# Patient Record
Sex: MALE | Race: BLACK OR AFRICAN AMERICAN | NOT HISPANIC OR LATINO | Employment: FULL TIME | URBAN - METROPOLITAN AREA
[De-identification: names, ages, dates, MRNs, and addresses within clinical notes are randomized per-mention and may not be internally consistent; named-entity substitution may affect disease eponyms.]

---

## 2017-05-09 ENCOUNTER — HOSPITAL ENCOUNTER (EMERGENCY)
Facility: CLINIC | Age: 37
Discharge: HOME | End: 2017-05-09
Attending: EMERGENCY MEDICINE | Admitting: EMERGENCY MEDICINE
Payer: COMMERCIAL

## 2017-05-09 VITALS
OXYGEN SATURATION: 99 % | HEIGHT: 67 IN | RESPIRATION RATE: 16 BRPM | WEIGHT: 230 LBS | HEART RATE: 85 BPM | SYSTOLIC BLOOD PRESSURE: 149 MMHG | DIASTOLIC BLOOD PRESSURE: 106 MMHG | TEMPERATURE: 98.9 F | BODY MASS INDEX: 36.1 KG/M2

## 2017-05-09 DIAGNOSIS — K92.0 HEMATEMESIS WITHOUT NAUSEA: ICD-10-CM

## 2017-05-09 DIAGNOSIS — K22.6 MALLORY-WEISS TEAR: ICD-10-CM

## 2017-05-09 LAB
ABO + RH BLD: NORMAL
ABO + RH BLD: NORMAL
ALBUMIN SERPL-MCNC: 4.5 G/DL (ref 3.4–5)
ALP SERPL-CCNC: 56 U/L (ref 40–150)
ALT SERPL W P-5'-P-CCNC: 63 U/L (ref 0–70)
ANION GAP SERPL CALCULATED.3IONS-SCNC: 9 MMOL/L (ref 3–14)
AST SERPL W P-5'-P-CCNC: 35 U/L (ref 0–45)
BASOPHILS # BLD AUTO: 0 10E9/L (ref 0–0.2)
BASOPHILS NFR BLD AUTO: 0.3 %
BILIRUB SERPL-MCNC: 0.5 MG/DL (ref 0.2–1.3)
BLD GP AB SCN SERPL QL: NORMAL
BLOOD BANK CMNT PATIENT-IMP: NORMAL
BUN SERPL-MCNC: 13 MG/DL (ref 7–30)
CALCIUM SERPL-MCNC: 9.1 MG/DL (ref 8.5–10.1)
CHLORIDE SERPL-SCNC: 106 MMOL/L (ref 94–109)
CO2 SERPL-SCNC: 25 MMOL/L (ref 20–32)
CREAT SERPL-MCNC: 0.98 MG/DL (ref 0.66–1.25)
DIFFERENTIAL METHOD BLD: NORMAL
EOSINOPHIL # BLD AUTO: 0.2 10E9/L (ref 0–0.7)
EOSINOPHIL NFR BLD AUTO: 2.7 %
ERYTHROCYTE [DISTWIDTH] IN BLOOD BY AUTOMATED COUNT: 12.9 % (ref 10–15)
GFR SERPL CREATININE-BSD FRML MDRD: 86 ML/MIN/1.7M2
GLUCOSE SERPL-MCNC: 90 MG/DL (ref 70–99)
HCT VFR BLD AUTO: 40.2 % (ref 40–53)
HGB BLD-MCNC: 14 G/DL (ref 13.3–17.7)
HGB BLD-MCNC: 14.2 G/DL (ref 13.3–17.7)
IMM GRANULOCYTES # BLD: 0 10E9/L (ref 0–0.4)
IMM GRANULOCYTES NFR BLD: 0.3 %
LYMPHOCYTES # BLD AUTO: 2 10E9/L (ref 0.8–5.3)
LYMPHOCYTES NFR BLD AUTO: 28.4 %
MCH RBC QN AUTO: 31.8 PG (ref 26.5–33)
MCHC RBC AUTO-ENTMCNC: 35.3 G/DL (ref 31.5–36.5)
MCV RBC AUTO: 90 FL (ref 78–100)
MONOCYTES # BLD AUTO: 0.6 10E9/L (ref 0–1.3)
MONOCYTES NFR BLD AUTO: 9 %
NEUTROPHILS # BLD AUTO: 4.1 10E9/L (ref 1.6–8.3)
NEUTROPHILS NFR BLD AUTO: 59.3 %
NRBC # BLD AUTO: 0 10*3/UL
NRBC BLD AUTO-RTO: 0 /100
PLATELET # BLD AUTO: 189 10E9/L (ref 150–450)
POTASSIUM SERPL-SCNC: 4.6 MMOL/L (ref 3.4–5.3)
PROT SERPL-MCNC: 7.9 G/DL (ref 6.8–8.8)
RBC # BLD AUTO: 4.46 10E12/L (ref 4.4–5.9)
SODIUM SERPL-SCNC: 140 MMOL/L (ref 133–144)
SPECIMEN EXP DATE BLD: NORMAL
WBC # BLD AUTO: 6.9 10E9/L (ref 4–11)

## 2017-05-09 PROCEDURE — 25000128 H RX IP 250 OP 636: Performed by: EMERGENCY MEDICINE

## 2017-05-09 PROCEDURE — 86900 BLOOD TYPING SEROLOGIC ABO: CPT | Performed by: EMERGENCY MEDICINE

## 2017-05-09 PROCEDURE — 85018 HEMOGLOBIN: CPT | Mod: 91 | Performed by: EMERGENCY MEDICINE

## 2017-05-09 PROCEDURE — 86901 BLOOD TYPING SEROLOGIC RH(D): CPT | Performed by: EMERGENCY MEDICINE

## 2017-05-09 PROCEDURE — 99284 EMERGENCY DEPT VISIT MOD MDM: CPT | Mod: 25

## 2017-05-09 PROCEDURE — 96361 HYDRATE IV INFUSION ADD-ON: CPT

## 2017-05-09 PROCEDURE — 86850 RBC ANTIBODY SCREEN: CPT | Performed by: EMERGENCY MEDICINE

## 2017-05-09 PROCEDURE — 96360 HYDRATION IV INFUSION INIT: CPT

## 2017-05-09 PROCEDURE — 80053 COMPREHEN METABOLIC PANEL: CPT | Performed by: EMERGENCY MEDICINE

## 2017-05-09 PROCEDURE — 85025 COMPLETE CBC W/AUTO DIFF WBC: CPT | Performed by: EMERGENCY MEDICINE

## 2017-05-09 RX ORDER — SODIUM CHLORIDE 9 MG/ML
1000 INJECTION, SOLUTION INTRAVENOUS CONTINUOUS
Status: DISCONTINUED | OUTPATIENT
Start: 2017-05-09 | End: 2017-05-09 | Stop reason: HOSPADM

## 2017-05-09 RX ORDER — ASPIRIN 81 MG/1
81 TABLET, CHEWABLE ORAL DAILY
COMMUNITY

## 2017-05-09 RX ADMIN — SODIUM CHLORIDE 1000 ML: 9 INJECTION, SOLUTION INTRAVENOUS at 15:20

## 2017-05-09 ASSESSMENT — ENCOUNTER SYMPTOMS
BLOOD IN STOOL: 0
VOMITING: 1
NAUSEA: 1
DIARRHEA: 0
CONSTIPATION: 0

## 2017-05-09 NOTE — ED PROVIDER NOTES
History     Chief Complaint:  Hematemesis     HPI   Pardeep Whaley is a 36 year old male who presents to the emergency department today for evaluation of hematemesis. The patient reports that he had spaghetti for lunch today at 1430 and then 10-15 minutes later he became nauseated and went to the bathroom and had an episode of vomiting. He states that he vomited up all his food and then at the very end he vomited up several dark clots of blood. The patient notes that he did not have any pain at the time of this episode of hematemesis and he currently endorses no pain here in the emergency department. He denies any recent black, blood, or otherwise abnormal bowel movements. He drinks about one cup of coffee daily and he reports that his stress level has been grossly normal recently.     Allergies:  Lisinopril    Medications:    Metoprolol   Tylenol   Benadryl  Zyrtec     Past Medical History:    Asthma  Hypertension  Paroxysmal atrial fibrillation     Past Surgical History:    Cardioversion  Ablation focal atrial fibrillation     Family History:    Father: Coronary Artery Disease, Diabetes   Maternal Grandmother: Heart Disease     Social History:  Smoking Status: Never Smoker  Smokeless Tobacco: Never Used  Alcohol Use: Negative   Marital Status:   [2]     Review of Systems   Gastrointestinal: Positive for nausea and vomiting (Hematemesis). Negative for blood in stool, constipation and diarrhea.   All other systems reviewed and are negative.    Physical Exam   Vitals:  Patient Vitals for the past 24 hrs:   BP Temp Temp src Pulse Heart Rate Resp SpO2 Height Weight   05/09/17 1801 - - - - 78 16 99 % - -   05/09/17 1700 (!) 149/106 - - - 80 13 99 % - -   05/09/17 1645 - - - - 71 11 98 % - -   05/09/17 1630 (!) 149/101 - - - 70 9 98 % - -   05/09/17 1615 - - - - 73 15 99 % - -   05/09/17 1600 (!) 163/99 - - - 75 16 99 % - -   05/09/17 1530 (!) 144/98 - - - 78 11 - - -   05/09/17 1521 - - - - 80 13 - - -  "  05/09/17 1516 (!) 149/98 - - - - - - - -   05/09/17 1509 (!) 150/97 98.9  F (37.2  C) Oral 85 - 18 98 % 1.702 m (5' 7\") 104.3 kg (230 lb)      Physical Exam  Eye:  Pupils are equal, round, and reactive.  Extraocular movements intact.    ENT:  No rhinorrhea.  Moist mucus membranes.  Normal tongue and tonsil.    Cardiac:  Regular rate and rhythm.  No murmurs, gallops, or rubs.    Pulmonary:  Clear to auscultation bilaterally.  No wheezes, rales, or rhonchi.    Abdomen:  Positive bowel sounds.  Abdomen is soft and non-distended, without focal tenderness.    Musculoskeletal:  Normal movement of all extremities without evidence for deficit.    Skin:  Warm and dry without rashes.    Neurologic:  Non-focal exam without asymmetric weakness or numbness.     Psychiatric:  Normal affect with appropriate interaction with examiner.      Emergency Department Course     Laboratory:  Laboratory findings were communicated with the patient who voiced understanding of the findings.    Hemoglobin: 14.0  CBC: WBC 6.9, HGB 14.2,   CMP: Creatinine 0.98  ABO/Rh type and screen: ABO: O, Rh(D): Positive, Antibody Screen: Negative     Interventions:  1520 NS 1000 ml IV     Emergency Department Course:  Nursing notes and vitals reviewed.  I performed an exam of the patient as documented above.   IV was inserted and blood was drawn for laboratory testing, results above.  I discussed the treatment plan with the patient. They expressed understanding of this plan and consented to discharge. They will be discharged home with instructions for care and follow up. In addition, the patient will return to the emergency department if their symptoms persist, worsen, if new symptoms arise or if there is any concern.  All questions were answered.  I personally reviewed the lab results with the patient and answered all related questions prior to discharge.  Impression & Plan      Medical Decision Making:  Pardeep Whaley is a healthy 36 year old male " who is not on anticoagulants presents to us after having some hematemesis. He notes that he ate some spaghetti that did not agree with him, causing him to feel nauseated where he then suffered 2-3 episodes of vomiting. As the vomiting continued, he then started to notice that there was maroon blood and eventually some blood clots associated with it. That concerned him to come in. On my exam here, he has no complaints. His abdomen is soft and benign. An initial round of blood work shows no acute abnormality. I kept him here for a 2 hour hemoglobin recheck where he showed no drop in his hemoglobin. I feel that this all likely represents a Sagrario-Mcnamara tear rather than a gastric ulceration or other intraabdominal bleeding. However, I was clear with him that he needs to be vigilant, looking for signs of melena and that there should be no hesitation to return to the emergency department if he has further blood emesis, abdominal pain, syncope, or other emergent concerns.       Diagnosis:    ICD-10-CM    1. Hematemesis without nausea K92.0    2. 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    Scribe Disclosure:  I, Harsh Moreno, am serving as a scribe at 3:10 PM on 5/9/2017 to document services personally performed by Trierweiler, Chad A, MD, based on my observations and the provider's statements to me.     EMERGENCY DEPARTMENT       Trierweiler, Chad A, MD  05/10/17 2031

## 2017-05-09 NOTE — ED NOTES
Bed: ED24  Expected date:   Expected time:   Means of arrival:   Comments:  allina - vomiting blood

## 2017-05-09 NOTE — DISCHARGE INSTRUCTIONS
Sagrario-Mcnamara Tear  Your esophagus is the tube that carries food from your mouth to your stomach. It plays an important role in digestion. Sometimes a person can tear the tissue of the lower esophagus, and it can begin to bleed. This is called a Sagrario-Mcnamara tear.  Causes and Symptoms of a Sagrario-Mcnamara Tear  The most common cause of a tear is violent coughing or vomiting. Increased pressure in your belly (abdomen) from a hiatal hernia or childbirth can also lead to a tear.  A tear can cause symptoms such as:    Vomit that is bright red or looks like coffee grounds    Stools that are black or sticky like tar    Weakness, dizziness, faintness, or shortness of breath    Diarrhea    Abdominal pain  If the bleeding is not treated, it can continue for a long time. This can cause anemia, fatigue, and shortness of breath.  Diagnosing and Treating a Sagrario-Mcnamara Tear  If you have symptoms, your health care provider may test your stool to look for blood. You may have an endoscopy. This is a procedure to look at your esophagus. It uses a tool called an endoscope. This is a thin, flexible tube with a camera at the end. The scope is put through your mouth and down into your esophagus. This lets your health care provider look at the inside of your esophagus.  In most cases, a Sagrario-Mcnamara tear will stop bleeding and heal on its own. But some people will need treatment. If needed, your health care provider will treat your tear through the endoscope. You may have an injection to make the bleeding stop. You may be given a heat treatment to close the wound. Or a tiny clip may be used to close the tear.     When to Call the Health Care Provider  In rare cases, a Sagrario-Mcnamara tear can lead to severe internal bleeding. This is a medical emergency. Call 911 if you have:    A rapid, weak pulse    A blue tint to your lips or fingernails    Very little urine    Pale skin that s cool and moist to the touch    Confusion    Shallow  breathing     4599-3449 [employer]. [address] [place] [address], [place] [address]. All rights reserved. This information is not intended as a substitute for professional medical care. Always follow your healthcare professional's instructions.

## 2017-05-09 NOTE — ED AVS SNAPSHOT
Emergency Department    [address] [facility] [address]    Phone:  [phone]    Fax:  [phone]                                       Pardeep Whaley   MRN: [id number]    Department:   Emergency Department   Date of Visit:  5/9/2017           Patient Information     Date Of Birth          1980        Your diagnoses for this visit were:     Hematemesis without nausea     Sagrario-Mcnamara tear        You were seen by Trierweiler, Chad A, MD.      Follow-up Information     Follow up with Casper Murdock MD In 1 week.    Specialty:  Family Practice    Contact information:    [facility]  [address] [facility] [id number] [phone]          Follow up with  Emergency Department.    Specialty:  EMERGENCY MEDICINE    Why:  If symptoms worsen    Contact information:    [address] [facility] [id number] [phone]        Discharge Instructions         Sagrario-Mcnamara Tear  Your esophagus is the tube that carries food from your mouth to your stomach. It plays an important role in digestion. Sometimes a person can tear the tissue of the lower esophagus, and it can begin to bleed. This is called a Sagrario-Mcnamara tear.  Causes and Symptoms of a Sagrario-Mcnamara Tear  The most common cause of a tear is violent coughing or vomiting. Increased pressure in your belly (abdomen) from a hiatal hernia or childbirth can also lead to a tear.  A tear can cause symptoms such as:    Vomit that is bright red or looks like coffee grounds    Stools that are black or sticky like tar    Weakness, dizziness, faintness, or shortness of breath    Diarrhea    Abdominal pain  If the bleeding is not treated, it can continue for a long time. This can cause anemia, fatigue, and shortness of breath.  Diagnosing and Treating a Sagrario-Mcnamara Tear  If you have symptoms, your health care provider may test your stool to look for blood. You may have an endoscopy. This is a procedure to look at your  esophagus. It uses a tool called an endoscope. This is a thin, flexible tube with a camera at the end. The scope is put through your mouth and down into your esophagus. This lets your health care provider look at the inside of your esophagus.  In most cases, a Sagrario-Mcnamara tear will stop bleeding and heal on its own. But some people will need treatment. If needed, your health care provider will treat your tear through the endoscope. You may have an injection to make the bleeding stop. You may be given a heat treatment to close the wound. Or a tiny clip may be used to close the tear.     When to Call the Health Care Provider  In rare cases, a Sagrario-Mcnamara tear can lead to severe internal bleeding. This is a medical emergency. Call 911 if you have:    A rapid, weak pulse    A blue tint to your lips or fingernails    Very little urine    Pale skin that s cool and moist to the touch    Confusion    Shallow breathing     6091-9812 [employer]. [address] [place] [address]. All rights reserved. This information is not intended as a substitute for professional medical care. Always follow your healthcare professional's instructions.          24 Hour Appointment Hotline       To make an appointment at any [facility], call [phone] ([phone]). If you don't have a family doctor or clinic, we will help you find one. [facility] clinics are conveniently located to serve the needs of you and your family.             Review of your medicines      Our records show that you are taking the medicines listed below. If these are incorrect, please call your family doctor or clinic.        Dose / Directions Last dose taken    aspirin 81 MG chewable tablet   Dose:  81 mg        Take 81 mg by mouth daily   Refills:  0        BENADRYL PO   Dose:  25 mg        Take 25 mg by mouth every 6 hours as needed   Refills:  0        cetirizine 10 MG tablet   Commonly known as:  zyrTEC   Dose:  10 mg         Take 10 mg by mouth daily as needed   Refills:  0        metoprolol 25 MG 24 hr tablet   Commonly known as:  TOPROL-XL   Dose:  75 mg        Take 3 tablets (75 mg) by mouth 2 times daily   Refills:  0        TYLENOL PM EXTRA STRENGTH PO        Take by mouth daily as needed   Refills:  0                Procedures and tests performed during your visit     ABO/Rh type and screen    CBC with platelets differential    Comprehensive metabolic panel    Hemoglobin    Peripheral IV catheter      Orders Needing Specimen Collection     None      Pending Results     No orders found from 5/7/2017 to 5/10/2017.            Pending Culture Results     No orders found from 5/7/2017 to 5/10/2017.            Pending Results Instructions     If you had any lab results that were not finalized at the time of your Discharge, you can call the ED Lab Result RN at [phone]. You will be contacted by this team for any positive Lab results or changes in treatment. The nurses are available 7 days a week from 10A to 6:30P.  You can leave a message 24 hours per day and they will return your call.        Test Results From Your Hospital Stay        5/9/2017  3:33 PM      Component Results     Component Value Ref Range & Units Status    WBC 6.9 4.0 - 11.0 10e9/L Final    RBC Count 4.46 4.4 - 5.9 10e12/L Final    Hemoglobin 14.2 13.3 - 17.7 g/dL Final    Hematocrit 40.2 40.0 - 53.0 % Final    MCV 90 78 - 100 fl Final    MCH 31.8 26.5 - 33.0 pg Final    MCHC 35.3 31.5 - 36.5 g/dL Final    RDW 12.9 10.0 - 15.0 % Final    Platelet Count 189 150 - 450 10e9/L Final    Diff Method Automated Method  Final    % Neutrophils 59.3 % Final    % Lymphocytes 28.4 % Final    % Monocytes 9.0 % Final    % Eosinophils 2.7 % Final    % Basophils 0.3 % Final    % Immature Granulocytes 0.3 % Final    Nucleated RBCs 0 0 /100 Final    Absolute Neutrophil 4.1 1.6 - 8.3 10e9/L Final    Absolute Lymphocytes 2.0 0.8 - 5.3 10e9/L Final    Absolute Monocytes 0.6 0.0 - 1.3  10e9/L Final    Absolute Eosinophils 0.2 0.0 - 0.7 10e9/L Final    Absolute Basophils 0.0 0.0 - 0.2 10e9/L Final    Abs Immature Granulocytes 0.0 0 - 0.4 10e9/L Final    Absolute Nucleated RBC 0.0  Final         5/9/2017  3:49 PM      Component Results     Component Value Ref Range & Units Status    Sodium 140 133 - 144 mmol/L Final    Potassium 4.6 3.4 - 5.3 mmol/L Final    Chloride 106 94 - 109 mmol/L Final    Carbon Dioxide 25 20 - 32 mmol/L Final    Anion Gap 9 3 - 14 mmol/L Final    Glucose 90 70 - 99 mg/dL Final    Urea Nitrogen 13 7 - 30 mg/dL Final    Creatinine 0.98 0.66 - 1.25 mg/dL Final    GFR Estimate 86 >60 mL/min/1.7m2 Final    Non  GFR Calc    GFR Estimate If Black >90   GFR Calc   >60 mL/min/1.7m2 Final    Calcium 9.1 8.5 - 10.1 mg/dL Final    Bilirubin Total 0.5 0.2 - 1.3 mg/dL Final    Albumin 4.5 3.4 - 5.0 g/dL Final    Protein Total 7.9 6.8 - 8.8 g/dL Final    Alkaline Phosphatase 56 40 - 150 U/L Final    ALT 63 0 - 70 U/L Final    AST 35 0 - 45 U/L Final         5/9/2017  4:07 PM      Component Results     Component    ABO    O    RH(D)     Pos    Antibody Screen    Neg    Test Valid Only At    [facility]    Specimen Expires    05/12/2017 5/9/2017  5:31 PM      Component Results     Component Value Ref Range & Units Status    Hemoglobin 14.0 13.3 - 17.7 g/dL Final                Clinical Quality Measure: Blood Pressure Screening     Your blood pressure was checked while you were in the emergency department today. The last reading we obtained was  BP: (!) 149/106 . Please read the guidelines below about what these numbers mean and what you should do about them.  If your systolic blood pressure (the top number) is less than 120 and your diastolic blood pressure (the bottom number) is less than 80, then your blood pressure is normal. There is nothing more that you need to do about it.  If your systolic blood pressure (the top number) is  "120-139 or your diastolic blood pressure (the bottom number) is 80-89, your blood pressure may be higher than it should be. You should have your blood pressure rechecked within a year by a primary care provider.  If your systolic blood pressure (the top number) is 140 or greater or your diastolic blood pressure (the bottom number) is 90 or greater, you may have high blood pressure. High blood pressure is treatable, but if left untreated over time it can put you at risk for heart attack, stroke, or kidney failure. You should have your blood pressure rechecked by a primary care provider within the next 4 weeks.  If your provider in the emergency department today gave you specific instructions to follow-up with your doctor or provider even sooner than that, you should follow that instruction and not wait for up to 4 weeks for your follow-up visit.        Thank you for choosing [facility]       Thank you for choosing [facility] for your care. Our goal is always to provide you with excellent care. Hearing back from our patients is one way we can continue to improve our services. Please take a few minutes to complete the written survey that you may receive in the mail after you visit with us. Thank you!        [employer] Information     [employer] lets you send messages to your doctor, view your test results, renew your prescriptions, schedule appointments and more. To sign up, go to [email or web address] . Click on \"Log in\" on the left side of the screen, which will take you to the Welcome page. Then click on \"Sign up Now\" on the right side of the page.     You will be asked to enter the access code listed below, as well as some personal information. Please follow the directions to create your username and password.     Your access code is: [email or web address]  Expires: 2017  3:31 PM     Your access code will  in 90 days. If you need help or a new code, please call your [facility] clinic or [phone].        Care EveryWhere " ID     This is your Care EveryWhere ID. This could be used by other organizations to access your [facility] medical records  [id number]        After Visit Summary       This is your record. Keep this with you and show to your community pharmacist(s) and doctor(s) at your next visit.

## 2020-09-18 ENCOUNTER — HOSPITAL ENCOUNTER (EMERGENCY)
Facility: CLINIC | Age: 40
Discharge: HOME | End: 2020-09-18
Attending: EMERGENCY MEDICINE | Admitting: EMERGENCY MEDICINE
Payer: COMMERCIAL

## 2020-09-18 VITALS
SYSTOLIC BLOOD PRESSURE: 118 MMHG | TEMPERATURE: 99.4 F | DIASTOLIC BLOOD PRESSURE: 76 MMHG | RESPIRATION RATE: 41 BRPM | HEIGHT: 66 IN | WEIGHT: 210 LBS | BODY MASS INDEX: 33.75 KG/M2 | OXYGEN SATURATION: 99 % | HEART RATE: 75 BPM

## 2020-09-18 DIAGNOSIS — R42 DIZZINESS: ICD-10-CM

## 2020-09-18 DIAGNOSIS — R00.2 PALPITATIONS: ICD-10-CM

## 2020-09-18 PROBLEM — I10 HYPERTENSION: Status: ACTIVE | Noted: 2020-09-18

## 2020-09-18 LAB
ALBUMIN SERPL-MCNC: 4.3 G/DL (ref 3.4–5)
ALP SERPL-CCNC: 48 U/L (ref 40–150)
ALT SERPL W P-5'-P-CCNC: 32 U/L (ref 0–70)
ANION GAP SERPL CALCULATED.3IONS-SCNC: 3 MMOL/L (ref 3–14)
AST SERPL W P-5'-P-CCNC: 20 U/L (ref 0–45)
BASOPHILS # BLD AUTO: 0 10E9/L (ref 0–0.2)
BASOPHILS NFR BLD AUTO: 0.4 %
BILIRUB SERPL-MCNC: 0.7 MG/DL (ref 0.2–1.3)
BUN SERPL-MCNC: 19 MG/DL (ref 7–30)
CALCIUM SERPL-MCNC: 8.9 MG/DL (ref 8.5–10.1)
CHLORIDE SERPL-SCNC: 107 MMOL/L (ref 94–109)
CO2 SERPL-SCNC: 28 MMOL/L (ref 20–32)
CREAT SERPL-MCNC: 1.06 MG/DL (ref 0.66–1.25)
DIFFERENTIAL METHOD BLD: NORMAL
EOSINOPHIL # BLD AUTO: 0.1 10E9/L (ref 0–0.7)
EOSINOPHIL NFR BLD AUTO: 1.8 %
ERYTHROCYTE [DISTWIDTH] IN BLOOD BY AUTOMATED COUNT: 12.6 % (ref 10–15)
GFR SERPL CREATININE-BSD FRML MDRD: 86 ML/MIN/{1.73_M2}
GLUCOSE SERPL-MCNC: 92 MG/DL (ref 70–99)
HCT VFR BLD AUTO: 41.9 % (ref 40–53)
HGB BLD-MCNC: 14.2 G/DL (ref 13.3–17.7)
IMM GRANULOCYTES # BLD: 0 10E9/L (ref 0–0.4)
IMM GRANULOCYTES NFR BLD: 0.3 %
LYMPHOCYTES # BLD AUTO: 2.2 10E9/L (ref 0.8–5.3)
LYMPHOCYTES NFR BLD AUTO: 31.2 %
MAGNESIUM SERPL-MCNC: 2.1 MG/DL (ref 1.6–2.3)
MCH RBC QN AUTO: 30.9 PG (ref 26.5–33)
MCHC RBC AUTO-ENTMCNC: 33.9 G/DL (ref 31.5–36.5)
MCV RBC AUTO: 91 FL (ref 78–100)
MONOCYTES # BLD AUTO: 0.6 10E9/L (ref 0–1.3)
MONOCYTES NFR BLD AUTO: 8.8 %
NEUTROPHILS # BLD AUTO: 4.1 10E9/L (ref 1.6–8.3)
NEUTROPHILS NFR BLD AUTO: 57.5 %
NRBC # BLD AUTO: 0 10*3/UL
NRBC BLD AUTO-RTO: 0 /100
PLATELET # BLD AUTO: 321 10E9/L (ref 150–450)
POTASSIUM SERPL-SCNC: 4.3 MMOL/L (ref 3.4–5.3)
PROT SERPL-MCNC: 7.6 G/DL (ref 6.8–8.8)
RBC # BLD AUTO: 4.59 10E12/L (ref 4.4–5.9)
SODIUM SERPL-SCNC: 138 MMOL/L (ref 133–144)
TSH SERPL DL<=0.005 MIU/L-ACNC: 1.55 MU/L (ref 0.4–4)
WBC # BLD AUTO: 7.1 10E9/L (ref 4–11)

## 2020-09-18 PROCEDURE — 96360 HYDRATION IV INFUSION INIT: CPT

## 2020-09-18 PROCEDURE — 99284 EMERGENCY DEPT VISIT MOD MDM: CPT | Mod: 25

## 2020-09-18 PROCEDURE — 84443 ASSAY THYROID STIM HORMONE: CPT | Performed by: EMERGENCY MEDICINE

## 2020-09-18 PROCEDURE — 83735 ASSAY OF MAGNESIUM: CPT | Performed by: EMERGENCY MEDICINE

## 2020-09-18 PROCEDURE — 25800030 ZZH RX IP 258 OP 636: Performed by: EMERGENCY MEDICINE

## 2020-09-18 PROCEDURE — 85025 COMPLETE CBC W/AUTO DIFF WBC: CPT | Performed by: EMERGENCY MEDICINE

## 2020-09-18 PROCEDURE — 80053 COMPREHEN METABOLIC PANEL: CPT | Performed by: EMERGENCY MEDICINE

## 2020-09-18 PROCEDURE — 93005 ELECTROCARDIOGRAM TRACING: CPT

## 2020-09-18 RX ADMIN — SODIUM CHLORIDE 1000 ML: 9 INJECTION, SOLUTION INTRAVENOUS at 11:58

## 2020-09-18 ASSESSMENT — MIFFLIN-ST. JEOR: SCORE: 1805.3

## 2020-09-18 ASSESSMENT — ENCOUNTER SYMPTOMS
ROS GI COMMENTS: NO BOWEL INCONTINENCE
DECREASED CONCENTRATION: 1
LIGHT-HEADEDNESS: 1
ABDOMINAL PAIN: 0
NAUSEA: 0
VOMITING: 0
COUGH: 0
NUMBNESS: 1
FEVER: 0
PALPITATIONS: 1
SHORTNESS OF BREATH: 1

## 2020-09-18 NOTE — ED PROVIDER NOTES
History   Chief Complaint:  Shortness of Breath and Lightheadedness    HPI   Pardeep Whaley is a 39 year old male, with a history of asthma, hypertension, and paroxysmal atrial fibrillation, who presents to the ED for evaluation of shortness of breath and lightheadedness. The patient reports he was feeling short of breath when he woke up this morning along with some lightheadedness and palpitations. He states he can feel when he flips into atrial fibrillation and believes he is in it right now. He says he has been having some difficulty with concentration and notices some numbness and tingling in his bilateral cheeks and arms. He has had no recent falls or injuries. The patient denies any loss of bladder or bowel. He denies any fever, cough, congestion, chest pain, abdominal pain, nausea, vomiting, diarrhea, weakness, or any other acute symptoms.     Allergies:  Lisinopril    Medications:    Aspirin  Toprol    Past Medical History:    Asthma  Hypertension  Paroxysmal atrial fibrillation  Angioedema    Past Surgical History:    Cardiac ablation    Family History:    CAD  Diabetes    Social History:  Smoking status: Never  Alcohol use: No  Drug use: No  PCP: Casper Murdock MD  Marital Status:   [2]    Review of Systems   Constitutional: Negative for fever.   HENT: Negative for congestion.    Respiratory: Positive for shortness of breath. Negative for cough.    Cardiovascular: Positive for palpitations. Negative for chest pain and leg swelling.   Gastrointestinal: Negative for abdominal pain, nausea and vomiting.        No bowel incontinence   Genitourinary:        No bladder incontinence   Neurological: Positive for light-headedness and numbness.   Psychiatric/Behavioral: Positive for decreased concentration.   All other systems reviewed and are negative.     Physical Exam     Patient Vitals for the past 24 hrs:   BP Temp Temp src Pulse Resp SpO2 Height Weight   09/18/20 1345 -- -- -- 75 (!) 41 99 % -- --  "  09/18/20 1330 118/76 -- -- 80 10 98 % -- --   09/18/20 1315 -- -- -- 73 13 97 % -- --   09/18/20 1300 (!) 135/97 -- -- 75 13 96 % -- --   09/18/20 1140 (!) 138/108 99.4  F (37.4  C) Oral 86 16 98 % 1.676 m (5' 6\") 95.3 kg (210 lb)       Physical Exam  General: Well appearing, nontoxic.  Resting comfortably  Head:  Scalp, face, and head appear normal  Eyes:  Pupils are equal, round, and reactive to light    Conjunctivae non-injected and sclerae white  ENT:    The external nose is normal    Pinnae are normal    The oropharynx is normal, mucous membranes moist    Uvula is in the midline  Neck:  Normal range of motion    There is no rigidity noted    Trachea is in the midline  CV:  Regular rate and rhythm     Normal S1/S2, no S3/S4    No murmur or rub. Radial pulses 2+ bilaterally   Resp:  Lungs are clear and equal bilaterally    There is no tachypnea    No increased work of breathing    No rales, wheezing, or rhonchi  GI:  Abdomen is soft, no rigidity or guarding    No distension, or mass    No tenderness or rebound tenderness   MS:  Normal muscular tone    Symmetric motor strength    No lower extremity edema  Skin:  No rash or acute skin lesions noted  Neuro: A&Ox3, GCS 15    CN II - XII intact    Speech clear, fluent, and normal    Strength 5/5 and symmetric in bilateral upper and lower extremities.    No pronator drift. No leg drift. SILT throughout.    No ankle clonus    FTN testing normal. No tremor.     Gait normal    No meningismus   Psych:  Normal affect.  Appropriate interactions.      Emergency Department Course   ECG (12:28:26):  Rate 73 bpm. AK interval 140. QRS duration 68. QT/QTc 356/392. P-R-T axes 58 66 43. Normal sinus rhythm. No significant change compared to EKG on 9/22/16. Interpreted at 1249 by Godfrey Gill MD.    Laboratory:  Laboratory findings were communicated with the patient who voiced understanding of the findings.    CBC: WNL (WBC 7.1, HGB 14.2, )    CMP: WNL (Creatinine " 1.06)    Magnesium: 2.1    TSH with free T4 reflex: 1.55     Interventions:  1158: NS 1L IV Bolus     Emergency Department Course:  Past medical records, nursing notes, and vitals reviewed.    1146 I performed an exam of the patient as documented above.     EKG obtained in the ED, see results above.   IV was inserted and blood was drawn for laboratory testing, results above.    1245 I rechecked the patient and discussed the results of his workup thus far.     Findings and plan explained to the Patient. Patient discharged home with instructions regarding supportive care, medications, and reasons to return. The importance of close follow-up was reviewed.    I personally reviewed the laboratory results with the Patient and answered all related questions prior to discharge.     Impression & Plan   Medical Decision Making:  Pardeep Whaley is a 39 year old male who presents for evaluation of palpitations and mild dizziness.  On my evaluation he is well-appearing, hemodynamically stable and afebrile.  He has no focal neurologic deficits and EKG reveals normal sinus rhythm without evidence of atrial fibrillation, other dysrhythmia or ischemia.  No evidence of heart strain.  Signs and symptoms are not consistent with pulmonary embolism.  Patient is low risk by Wells criteria and PERC negative.  Work-up in the emergency department is reassuring.  CBC, CMP, TSH, glucose are within normal limits.  The patient notes he has a history of anxiety and this may be related some of his symptoms.  He felt improved after IV fluid hydration in the emergency department.  At this time there is no evidence of serious underlying cardiac, respiratory or neurologic pathology.  No clinical findings to suggest pneumonia or other severe pulmonary process.  He has no hypoxia, increased work of breathing or respiratory distress.  I recommended close primary care follow-up if symptoms continue.  Close return precautions were provided and the patient  was discharged in stable and improved condition.    Diagnosis:    ICD-10-CM    1. Palpitations  R00.2    2. Dizziness  R42        Disposition:  Discharged to home.    Scribe Disclosure:  I, Chivo Fong, am serving as a scribe at 11:46 AM on 9/18/2020 to document services personally performed by Godfrey Gill MD based on my observations and the provider's statements to me.        Godfrey Gill MD  09/18/20 2100

## 2020-09-18 NOTE — ED AVS SNAPSHOT
Emergency Department  [address] [place] [address]  Phone:  [phone]  Fax:  [phone]                                    Pardeep Whaley   MRN: [id number]    Department:   Emergency Department   Date of Visit:  9/18/2020           After Visit Summary Signature Page    I have received my discharge instructions, and my questions have been answered. I have discussed any challenges I see with this plan with the nurse or doctor.    ..........................................................................................................................................  Patient/Patient Representative Signature      ..........................................................................................................................................  Patient Representative Print Name and Relationship to Patient    ..................................................               ................................................  Date                                   Time    ..........................................................................................................................................  Reviewed by Signature/Title    ...................................................              ..............................................  Date                                               Time          22EPIC Rev 08/18

## 2020-09-19 LAB — INTERPRETATION ECG - MUSE: NORMAL

## 2021-04-26 ENCOUNTER — HOSPITAL ENCOUNTER (EMERGENCY)
Facility: CLINIC | Age: 41
Discharge: HOME | End: 2021-04-26
Attending: EMERGENCY MEDICINE | Admitting: EMERGENCY MEDICINE
Payer: COMMERCIAL

## 2021-04-26 VITALS
TEMPERATURE: 97.5 F | RESPIRATION RATE: 18 BRPM | SYSTOLIC BLOOD PRESSURE: 160 MMHG | HEIGHT: 66 IN | DIASTOLIC BLOOD PRESSURE: 92 MMHG | BODY MASS INDEX: 33.89 KG/M2 | OXYGEN SATURATION: 96 % | HEART RATE: 91 BPM

## 2021-04-26 DIAGNOSIS — S05.01XA ABRASION OF RIGHT CORNEA, INITIAL ENCOUNTER: ICD-10-CM

## 2021-04-26 PROCEDURE — 99283 EMERGENCY DEPT VISIT LOW MDM: CPT

## 2021-04-26 PROCEDURE — 250N000009 HC RX 250: Performed by: EMERGENCY MEDICINE

## 2021-04-26 RX ORDER — POLYMYXIN B SULFATE AND TRIMETHOPRIM 1; 10000 MG/ML; [USP'U]/ML
1-2 SOLUTION OPHTHALMIC EVERY 4 HOURS
Qty: 10 ML | Refills: 0 | Status: SHIPPED | OUTPATIENT
Start: 2021-04-26

## 2021-04-26 RX ORDER — PROPARACAINE HYDROCHLORIDE 5 MG/ML
1 SOLUTION/ DROPS OPHTHALMIC ONCE
Status: COMPLETED | OUTPATIENT
Start: 2021-04-26 | End: 2021-04-26

## 2021-04-26 RX ORDER — CARBOXYMETHYLCELLULOSE SODIUM 5 MG/ML
1 SOLUTION/ DROPS OPHTHALMIC 3 TIMES DAILY PRN
Qty: 1 EACH | Refills: 0 | Status: SHIPPED | OUTPATIENT
Start: 2021-04-26

## 2021-04-26 RX ADMIN — FLUORESCEIN SODIUM 600 MCG: 0.6 STRIP OPHTHALMIC at 11:19

## 2021-04-26 RX ADMIN — PROPARACAINE HYDROCHLORIDE 1 DROP: 5 SOLUTION/ DROPS OPHTHALMIC at 11:34

## 2021-04-26 ASSESSMENT — ENCOUNTER SYMPTOMS
RHINORRHEA: 1
SORE THROAT: 0
FACIAL SWELLING: 1
EYE ITCHING: 0

## 2021-04-26 ASSESSMENT — VISUAL ACUITY
OS: 20/20
OD: 20/20

## 2021-04-26 NOTE — ED PROVIDER NOTES
"  History   Chief Complaint:  Facial Swelling       The history is provided by the patient.      Pardeep Whaley is a 40 year old male with history of hypertension and atrial fibrillation who presents with bilateral eye swelling, right worse then left, and runny nose when he woke up this morning. He took an allergy pill and here his eyes feel better however his vision is still blurry. He notes mild seasonal allergies. The patient did eat Walleye for the first time last night. Received his first dose of the Pfizer vaccine 3 days ago and soon noticed his left eye throbbing which improved over the weekend. Denies sore throat, itchy eyes, ear pain, or recent sick contacts. Does not wear contacts or glasses.     Review of Systems   HENT: Positive for facial swelling and rhinorrhea. Negative for ear pain and sore throat.    Eyes: Positive for visual disturbance. Negative for itching.   All other systems reviewed and are negative.      Allergies:  Lisinopril  Metoprolol    Medications:  The patient is not currently taking any prescribed medications.    Past Medical History:    Asthma  Hypertension  Paroxysmal atrial fibrillation  Angioedema    Past Surgical History:    Cardioversion  Ablation focal Afib    Family History:    Father: CAD, diabetes  Mother: Hypertension  Sister: Ovarian cysts, thyroid disease    Social History:  Patient presents to the ED alone.    Physical Exam     Patient Vitals for the past 24 hrs:   BP Temp Temp src Pulse Resp SpO2 Height   04/26/21 1015 (!) 160/92 97.5  F (36.4  C) Temporal 91 18 96 % 1.676 m (5' 6\")       Physical Exam  Constitutional: Alert, attentive, GCS 15  HENT:    Nose: Nose normal.   Eyes: Mildly injected right eye, intraocular pressure 18, scant linear dots fluorescein uptake in the right eye, extraocular movements full.  CV: regular rate and rhythm; no murmurs. Distal extremities warm and well perfused.   Chest: Non-labored breathing on RA.    Neurological: Alert, attentive, " moving all extremities equally.   Skin: Skin is warm and dry.    Emergency Department Course     Emergency Department Course:    Reviewed:  I reviewed nursing notes, vitals, past medical history and care everywhere    Assessments:  1104 I obtained history and examined the patient as noted above.     1144 I rechecked the patient. Eye exam     Interventions:  1119 Flu-Jonna 600 mcg Right eye   1134 Alcaine 1 drop both eyes     Disposition:  The patient was discharged to home.     Impression & Plan     Medical Decision Making:  Pardeep Whaley is a 40 year old male with no significant past medical history other than he received his first dose of the Pfizer vaccine on Friday, 2 days before evaluation, presenting for mild right eye pain, tearing, and blurry vision. On exam, he had some crusting with minimal injection. Pressures were normal and he had a small amount of fluorescence uptake consistent with likely corneal abrasion. I suspect either dry eye or mechanical abrasion as the cause with lower suspicision of infection given his only minor mattering. His pain full resolved after topical anesthetic consistent with corneal irration. I will initiate him on Polytrim drops as well as Refresh drops, and close opthalmology follow up. Return precautions were closely reviewed and patient was discharged.      Diagnosis:    ICD-10-CM    1. Abrasion of right cornea, initial encounter  S05.01XA        Discharge Medications:  New Prescriptions    CARBOXYMETHYLCELLULOSE PF (REFRESH PLUS) 0.5 % OPHTHALMIC SOLUTION    Place 1 drop into both eyes 3 times daily as needed for dry eyes    TRIMETHOPRIM-POLYMYXIN B (POLYTRIM) 92014-9.1 UNIT/ML-% OPHTHALMIC SOLUTION    Place 1-2 drops into the right eye every 4 hours     Harsh Vincent MD  [employer]  4:48 PM 04/26/21     Scribe Disclosure:  Jama HIDALGO, am serving as a scribe at 11:07 AM on 4/26/2021 to document services personally performed by Carlton  Harsh Moulton MD based on my observations and the provider's statements to me.                Harsh Vincent MD  04/26/21 0355

## 2021-04-26 NOTE — DISCHARGE INSTRUCTIONS
You should call ophthalmology to make an appointment and I recommend you see an eye doctor not an optometrist due to the faint abrasion you have on exam. You should try taking Flonase in addition to your Zyrtec as this could all have been started from allergic conjunctivitis or a viral process. I do not suspect bacterial infection at this time however you need antibiotics due to the corneal abrasion.

## 2021-04-26 NOTE — ED TRIAGE NOTES
Pt awoke this morning with jemma eye swelling and clear drainage from right eye with some blurry vision of right eye and painful - denies any trauma - pt had 1st dose of covid vaccine on Friday

## 2024-03-09 ENCOUNTER — APPOINTMENT (OUTPATIENT)
Dept: CT IMAGING | Facility: CLINIC | Age: 44
End: 2024-03-09
Attending: EMERGENCY MEDICINE

## 2024-03-09 ENCOUNTER — HOSPITAL ENCOUNTER (EMERGENCY)
Facility: CLINIC | Age: 44
Discharge: HOME | End: 2024-03-10
Attending: EMERGENCY MEDICINE | Admitting: EMERGENCY MEDICINE

## 2024-03-09 ENCOUNTER — NURSE TRIAGE (OUTPATIENT)
Dept: NURSING | Facility: CLINIC | Age: 44
End: 2024-03-09

## 2024-03-09 DIAGNOSIS — K04.7 APICAL ABSCESS: ICD-10-CM

## 2024-03-09 DIAGNOSIS — K05.30 PERIODONTITIS: ICD-10-CM

## 2024-03-09 LAB
ANION GAP SERPL CALCULATED.3IONS-SCNC: 12 MMOL/L (ref 7–15)
BASOPHILS # BLD AUTO: 0 10E3/UL (ref 0–0.2)
BASOPHILS NFR BLD AUTO: 0 %
BUN SERPL-MCNC: 20.5 MG/DL (ref 6–20)
CALCIUM SERPL-MCNC: 9.4 MG/DL (ref 8.6–10)
CHLORIDE SERPL-SCNC: 110 MMOL/L (ref 98–107)
CREAT BLD-MCNC: 0.9 MG/DL (ref 0.7–1.3)
CREAT SERPL-MCNC: 0.91 MG/DL (ref 0.67–1.17)
DEPRECATED HCO3 PLAS-SCNC: 19 MMOL/L (ref 22–29)
EGFRCR SERPLBLD CKD-EPI 2021: >60 ML/MIN/1.73M2
EGFRCR SERPLBLD CKD-EPI 2021: >90 ML/MIN/1.73M2
EOSINOPHIL # BLD AUTO: 0.1 10E3/UL (ref 0–0.7)
EOSINOPHIL NFR BLD AUTO: 1 %
ERYTHROCYTE [DISTWIDTH] IN BLOOD BY AUTOMATED COUNT: 12.3 % (ref 10–15)
GLUCOSE SERPL-MCNC: 130 MG/DL (ref 70–99)
HCT VFR BLD AUTO: 41.3 % (ref 40–53)
HGB BLD-MCNC: 14.4 G/DL (ref 13.3–17.7)
HOLD SPECIMEN: NORMAL
IMM GRANULOCYTES # BLD: 0.1 10E3/UL
IMM GRANULOCYTES NFR BLD: 0 %
LYMPHOCYTES # BLD AUTO: 1.1 10E3/UL (ref 0.8–5.3)
LYMPHOCYTES NFR BLD AUTO: 7 %
MCH RBC QN AUTO: 30.9 PG (ref 26.5–33)
MCHC RBC AUTO-ENTMCNC: 34.9 G/DL (ref 31.5–36.5)
MCV RBC AUTO: 89 FL (ref 78–100)
MONOCYTES # BLD AUTO: 0.9 10E3/UL (ref 0–1.3)
MONOCYTES NFR BLD AUTO: 5 %
NEUTROPHILS # BLD AUTO: 13.5 10E3/UL (ref 1.6–8.3)
NEUTROPHILS NFR BLD AUTO: 87 %
NRBC # BLD AUTO: 0 10E3/UL
NRBC BLD AUTO-RTO: 0 /100
PLATELET # BLD AUTO: 195 10E3/UL (ref 150–450)
POTASSIUM SERPL-SCNC: 4.5 MMOL/L (ref 3.4–5.3)
RBC # BLD AUTO: 4.66 10E6/UL (ref 4.4–5.9)
SODIUM SERPL-SCNC: 141 MMOL/L (ref 135–145)
WBC # BLD AUTO: 15.7 10E3/UL (ref 4–11)

## 2024-03-09 PROCEDURE — 96361 HYDRATE IV INFUSION ADD-ON: CPT

## 2024-03-09 PROCEDURE — 250N000011 HC RX IP 250 OP 636: Performed by: EMERGENCY MEDICINE

## 2024-03-09 PROCEDURE — 10060 I&D ABSCESS SIMPLE/SINGLE: CPT

## 2024-03-09 PROCEDURE — 70491 CT SOFT TISSUE NECK W/DYE: CPT

## 2024-03-09 PROCEDURE — 96365 THER/PROPH/DIAG IV INF INIT: CPT | Mod: 59

## 2024-03-09 PROCEDURE — 250N000009 HC RX 250: Performed by: EMERGENCY MEDICINE

## 2024-03-09 PROCEDURE — 99285 EMERGENCY DEPT VISIT HI MDM: CPT | Mod: 25

## 2024-03-09 PROCEDURE — 250N000013 HC RX MED GY IP 250 OP 250 PS 637: Performed by: EMERGENCY MEDICINE

## 2024-03-09 PROCEDURE — 258N000003 HC RX IP 258 OP 636: Performed by: EMERGENCY MEDICINE

## 2024-03-09 PROCEDURE — 80048 BASIC METABOLIC PNL TOTAL CA: CPT | Performed by: EMERGENCY MEDICINE

## 2024-03-09 PROCEDURE — 85025 COMPLETE CBC W/AUTO DIFF WBC: CPT | Performed by: EMERGENCY MEDICINE

## 2024-03-09 PROCEDURE — 93005 ELECTROCARDIOGRAM TRACING: CPT

## 2024-03-09 PROCEDURE — 36415 COLL VENOUS BLD VENIPUNCTURE: CPT | Performed by: EMERGENCY MEDICINE

## 2024-03-09 PROCEDURE — 82565 ASSAY OF CREATININE: CPT

## 2024-03-09 PROCEDURE — 87040 BLOOD CULTURE FOR BACTERIA: CPT | Performed by: EMERGENCY MEDICINE

## 2024-03-09 RX ORDER — IOPAMIDOL 755 MG/ML
90 INJECTION, SOLUTION INTRAVASCULAR ONCE
Status: COMPLETED | OUTPATIENT
Start: 2024-03-09 | End: 2024-03-09

## 2024-03-09 RX ORDER — AMPICILLIN AND SULBACTAM 2; 1 G/1; G/1
3 INJECTION, POWDER, FOR SOLUTION INTRAMUSCULAR; INTRAVENOUS ONCE
Status: COMPLETED | OUTPATIENT
Start: 2024-03-09 | End: 2024-03-10

## 2024-03-09 RX ORDER — ACETAMINOPHEN 325 MG/1
975 TABLET ORAL ONCE
Status: COMPLETED | OUTPATIENT
Start: 2024-03-09 | End: 2024-03-09

## 2024-03-09 RX ADMIN — SODIUM CHLORIDE 60 ML: 9 INJECTION, SOLUTION INTRAVENOUS at 23:00

## 2024-03-09 RX ADMIN — SODIUM CHLORIDE 1000 ML: 9 INJECTION, SOLUTION INTRAVENOUS at 22:41

## 2024-03-09 RX ADMIN — ACETAMINOPHEN 975 MG: 325 TABLET, FILM COATED ORAL at 22:57

## 2024-03-09 RX ADMIN — AMPICILLIN SODIUM AND SULBACTAM SODIUM 3 G: 2; 1 INJECTION, POWDER, FOR SOLUTION INTRAMUSCULAR; INTRAVENOUS at 23:18

## 2024-03-09 RX ADMIN — IOPAMIDOL 90 ML: 755 INJECTION, SOLUTION INTRAVENOUS at 23:00

## 2024-03-09 ASSESSMENT — COLUMBIA-SUICIDE SEVERITY RATING SCALE - C-SSRS
6. HAVE YOU EVER DONE ANYTHING, STARTED TO DO ANYTHING, OR PREPARED TO DO ANYTHING TO END YOUR LIFE?: NO
1. IN THE PAST MONTH, HAVE YOU WISHED YOU WERE DEAD OR WISHED YOU COULD GO TO SLEEP AND NOT WAKE UP?: NO
2. HAVE YOU ACTUALLY HAD ANY THOUGHTS OF KILLING YOURSELF IN THE PAST MONTH?: NO

## 2024-03-09 ASSESSMENT — ACTIVITIES OF DAILY LIVING (ADL)
ADLS_ACUITY_SCORE: 36

## 2024-03-10 VITALS
DIASTOLIC BLOOD PRESSURE: 118 MMHG | WEIGHT: 205 LBS | RESPIRATION RATE: 24 BRPM | OXYGEN SATURATION: 97 % | TEMPERATURE: 101.1 F | HEIGHT: 66 IN | BODY MASS INDEX: 32.95 KG/M2 | HEART RATE: 109 BPM | SYSTOLIC BLOOD PRESSURE: 157 MMHG

## 2024-03-10 LAB
ATRIAL RATE - MUSE: 118 BPM
DIASTOLIC BLOOD PRESSURE - MUSE: NORMAL MMHG
INTERPRETATION ECG - MUSE: NORMAL
P AXIS - MUSE: 51 DEGREES
PR INTERVAL - MUSE: 150 MS
QRS DURATION - MUSE: 64 MS
QT - MUSE: 302 MS
QTC - MUSE: 423 MS
R AXIS - MUSE: 59 DEGREES
SYSTOLIC BLOOD PRESSURE - MUSE: NORMAL MMHG
T AXIS - MUSE: 18 DEGREES
VENTRICULAR RATE- MUSE: 118 BPM

## 2024-03-10 RX ORDER — CHLORHEXIDINE GLUCONATE ORAL RINSE 1.2 MG/ML
15 SOLUTION DENTAL 2 TIMES DAILY
Qty: 118 ML | Refills: 0 | Status: SHIPPED | OUTPATIENT
Start: 2024-03-10

## 2024-03-10 NOTE — ED TRIAGE NOTES
Patient here with tooth pain which started one week ago     Triage Assessment (Adult)       Row Name 03/09/24 2024          Triage Assessment    Airway WDL WDL        Respiratory WDL    Respiratory WDL WDL        Skin Circulation/Temperature WDL    Skin Circulation/Temperature WDL WDL        Cardiac WDL    Cardiac WDL WDL        Peripheral/Neurovascular WDL    Peripheral Neurovascular WDL WDL        Cognitive/Neuro/Behavioral WDL    Cognitive/Neuro/Behavioral WDL WDL

## 2024-03-10 NOTE — TELEPHONE ENCOUNTER
"Nurse Triage SBAR    Is this a 2nd Level Triage? NO    Situation: Toothache    Background: :na    Assessment: Patient reports onset of left side tooth pain for a week, it was intermittent and is now a constant 7/10 after treating with ibuprofen and acetaminophen. He reports temporal temperature of 100. Patient reports difficulty swallowing, needs to spit out saliva. He reports tongue swelling and pain. He reports that he was dizzy and disoriented earlier, but now subsided.    Protocol Recommended Disposition:   According to the protocol, patient should go to ED now (or PCP triage), no [facility] PCP, advised to go to ED.  Care advice given. Patient verbalizes understanding and agrees with plan of care.Patient plans to go [facility] ED.    Layla Loera RN  03/09/24 7:49 PM  [facility] Nurse Advisor        Reason for Disposition   Tongue is very swollen and tender    Additional Information   Negative: Shock suspected (e.g., cold/pale/clammy skin, too weak to stand, low BP, rapid pulse)   Negative: [1] Similar pain previously AND [2] it was from \"heart attack\"   Negative: [1] Similar pain previously AND [2] it was from \"angina\" AND [3] not relieved by nitroglycerin   Negative: Sounds like a life-threatening emergency to the triager   Negative: Chest pain   Negative: Toothache followed tooth injury   Negative: Toothache or mouth pain after tooth extraction   Negative: Canker sore (i.e., aphthous ulcer)    Protocols used: Toothache-A-AH    "

## 2024-03-10 NOTE — ED PROVIDER NOTES
"  History     Chief Complaint:  Dental Pain       HPI   Pardeep Whaley is a 43 year old male who present to the ED for an evaluation of dental pain. The patient reports that he has had tooth pain for a week. He complains of a fever, chills, palpitation and difficulty swallowing due to the pain. He adds that a week ago, his did have a cold that has resolved. He has been using ibuprofen with minimal relief and last dose was at 1900.     Independent Historian:    Patient, as per HPI.     Review of External Notes:  None      Medications:    Asprin   Zyrtec   Metoprolol     Past Medical History:    Asthma   Hypertension   Paroxysmal atrial fibrillation       Past Surgical History:    Cardioversion   Ablation focal Afib      Physical Exam   Patient Vitals for the past 24 hrs:   BP Temp Temp src Pulse Resp SpO2 Height Weight   03/10/24 0013 -- -- -- -- -- 97 % -- --   03/10/24 0010 (!) 157/118 -- -- 109 -- -- -- --   03/09/24 2239 -- -- -- 110 24 -- -- --   03/09/24 2219 (!) 182/113 -- -- (!) 129 -- 100 % -- --   03/09/24 2025 (!) 173/111 (!) 101.1  F (38.4  C) Oral 100 20 99 % 1.676 m (5' 6\") 93 kg (205 lb)        Physical Exam  Constitutional: Alert, attentive, GCS 15   HENT:    Mouth/Throat: Poor oral dentition, marked tenderness to percussion of the posterior inferior molar with marked surrounding gingival irritation with fluctuant swelling on the lingual aspect, floor the mouth is soft, he is handling his own secretions, uvula midline.  Neck: No significant cervical adenopathy, ranging his neck freely  Eyes: EOM are normal, anicteric, conjugate gaze  CV: distal extremities warm, well perfused  Chest: Non-labored breathing on RA  Neurological: Alert, attentive, moving all extremities equally.   Skin: Skin is warm and dry.      Emergency Department Course     ECG results from 03/09/24   EKG 12 lead     Value    Systolic Blood Pressure     Diastolic Blood Pressure     Ventricular Rate 118    Atrial Rate 118    AZ Interval " 150    QRS Duration 64        QTc 423    P Axis 51    R AXIS 59    T Axis 18    Interpretation ECG      Sinus tachycardia  Possible Left atrial enlargement  Low voltage QRS  Septal infarct (cited on or before 18-SEP-2020)  Abnormal ECG  When compared with ECG of 18-SEP-2020 12:28,  Vent. rate has increased BY  45 BPM  Questionable change in initial forces of Anteroseptal leads         Imaging:  Soft tissue neck CT w contrast   Final Result   IMPRESSION:    1.  Left submandibular soft tissue swelling without drainable collection, presumably odontogenic from the left second mandibular molar periapical lucency.   2.  No subperiosteal collection or floor of mouth involvement.          Laboratory:  Labs Ordered and Resulted from Time of ED Arrival to Time of ED Departure   BASIC METABOLIC PANEL - Abnormal       Result Value    Sodium 141      Potassium 4.5      Chloride 110 (*)     Carbon Dioxide (CO2) 19 (*)     Anion Gap 12      Urea Nitrogen 20.5 (*)     Creatinine 0.91      GFR Estimate >90      Calcium 9.4      Glucose 130 (*)    CBC WITH PLATELETS AND DIFFERENTIAL - Abnormal    WBC Count 15.7 (*)     RBC Count 4.66      Hemoglobin 14.4      Hematocrit 41.3      MCV 89      MCH 30.9      MCHC 34.9      RDW 12.3      Platelet Count 195      % Neutrophils 87      % Lymphocytes 7      % Monocytes 5      % Eosinophils 1      % Basophils 0      % Immature Granulocytes 0      NRBCs per 100 WBC 0      Absolute Neutrophils 13.5 (*)     Absolute Lymphocytes 1.1      Absolute Monocytes 0.9      Absolute Eosinophils 0.1      Absolute Basophils 0.0      Absolute Immature Granulocytes 0.1      Absolute NRBCs 0.0     ISTAT CREATININE POCT - Normal    Creatinine POCT 0.9      GFR, ESTIMATED POCT >60     BLOOD CULTURE   BLOOD CULTURE        Procedures     Incision and Drainage     Procedure: Incision and Drainage     Consent: Verbal    Indication: Abscess    Location:  Dental    Size: 1 cm    Ultrasound Guidance:  No    Preparation: None     Anesthesia/Sedation: Bupivacaine - 0.5%     Procedure Detail:    Aspiration: No  Incision Type: Stab  Scalpel: 11 -blood-tinged fluid return  Wound Management: Left open   Packing: None     Patient Status: The patient tolerated the procedure well: Yes. There were no complications.      Emergency Department Course & Assessments:    Interventions:  Medications   sodium chloride 0.9% BOLUS 1,000 mL (1,000 mLs Intravenous $New Bag 3/9/24 2241)   ampicillin-sulbactam (UNASYN) 3 g vial to attach to  mL bag (3 g Intravenous $New Bag 3/9/24 2318)   acetaminophen (TYLENOL) tablet 975 mg (975 mg Oral $Given 3/9/24 2257)   iopamidol (ISOVUE-370) solution 90 mL (90 mLs Intravenous $Given 3/9/24 2300)   sodium chloride 0.9 % CT scan flush use (60 mLs Intravenous $Given 3/9/24 2300)          Independent Interpretation (X-rays, CTs, rhythm strip):   I personally reviewed his CT soft tissue neck, I see no evidence of airway compromise, over large abscess.    Social Determinants of Health affecting care:  None     Disposition:  The patient was discharged.    Impression & Plan      Medical Decision Makin-year-old male past medical history significant for paroxysmal A-fib, on aspirin, as well as hypertension presenting for evaluation of 1 week of progressive dental pain have developed a fever earlier today.  He is not having difficulty breathing or swallowing, on exam, he has clear periodontitis as well as marked tenderness to percussion of the posterior inferior molar concerning for periapical abscess.  CT imaging was obtained as he was found to be febrile and tachycardic, this shows no significant deep space infection, however on my exam he does have fluctuance along the gingival line.  I&D was performed as above with blood-tinged fluid returned, with significant improvement in the swelling.  He felt significant improved with IV fluids, Tylenol, heart rate was improved, he does have a mild  leukocytosis though low suspicion for bacteremia.  I did send blood cultures as a precaution.  He was given a dose of Unasyn.  We will plan for discharge home with initiation of Peridex, Augmentin, I recommend altering dose of Tylenol, ibuprofen he was given a form of community dentist.  Return precautions were reviewed including increasing worsening flulike symptoms, persistent fever, or difficulty breathing or swallowing, patient expressed understand this plan and he was discharged.      Diagnosis:    ICD-10-CM    1. Apical abscess  K04.7       2. Periodontitis  K05.30            Discharge Medications:  New Prescriptions    AMOXICILLIN-CLAVULANATE (AUGMENTIN) 875-125 MG TABLET    Take 1 tablet by mouth 2 times daily for 7 days    CHLORHEXIDINE (PERIDEX) 0.12 % SOLUTION    Swish and spit 15 mLs in mouth 2 times daily      Harsh Vincent MD  [employer]  12:22 AM 03/10/24       Scribe Disclosure:  I, Kathi Jose, am serving as a scribe at 10:28 PM on 3/9/2024 to document services personally performed by Harsh Vincent MD based on my observations and the provider's statements to me.  3/9/2024   Harsh Vincent MD Dunbar, John Forrest, MD  03/10/24 0022

## 2024-03-10 NOTE — DISCHARGE INSTRUCTIONS
I recommend altering dose of Tylenol and ibuprofen for your pain.  You take those milligrams Tylenol every 6 hours, 60 mg of ibuprofen every 6 hours.  You should use the antibiotics as prescribed, you can use the Peridex as well as prescribed.  She develop difficulty breathing, worsening difficulty swallowing, persistent high fever or worsening flulike symptoms you should be rechecked in the emergency department.  Otherwise, I would follow-up with dentistry.

## 2024-03-15 LAB
BACTERIA BLD CULT: NO GROWTH
BACTERIA BLD CULT: NO GROWTH

## 2024-08-24 ENCOUNTER — HOSPITAL ENCOUNTER (EMERGENCY)
Facility: CLINIC | Age: 44
Discharge: HOME | End: 2024-08-24
Attending: EMERGENCY MEDICINE | Admitting: EMERGENCY MEDICINE
Payer: COMMERCIAL

## 2024-08-24 ENCOUNTER — APPOINTMENT (OUTPATIENT)
Dept: GENERAL RADIOLOGY | Facility: CLINIC | Age: 44
End: 2024-08-24
Attending: EMERGENCY MEDICINE
Payer: COMMERCIAL

## 2024-08-24 ENCOUNTER — APPOINTMENT (OUTPATIENT)
Dept: ULTRASOUND IMAGING | Facility: CLINIC | Age: 44
End: 2024-08-24
Attending: EMERGENCY MEDICINE
Payer: COMMERCIAL

## 2024-08-24 VITALS
SYSTOLIC BLOOD PRESSURE: 157 MMHG | HEART RATE: 88 BPM | DIASTOLIC BLOOD PRESSURE: 116 MMHG | TEMPERATURE: 99.1 F | OXYGEN SATURATION: 96 % | RESPIRATION RATE: 21 BRPM

## 2024-08-24 DIAGNOSIS — I10 HYPERTENSION, UNSPECIFIED TYPE: ICD-10-CM

## 2024-08-24 LAB
ALBUMIN SERPL BCG-MCNC: 4.7 G/DL (ref 3.5–5.2)
ALP SERPL-CCNC: 62 U/L (ref 40–150)
ALT SERPL W P-5'-P-CCNC: 35 U/L (ref 0–70)
ANION GAP SERPL CALCULATED.3IONS-SCNC: 12 MMOL/L (ref 7–15)
AST SERPL W P-5'-P-CCNC: 22 U/L (ref 0–45)
BASOPHILS # BLD AUTO: 0.1 10E3/UL (ref 0–0.2)
BASOPHILS NFR BLD AUTO: 1 %
BILIRUB SERPL-MCNC: 0.3 MG/DL
BUN SERPL-MCNC: 16.7 MG/DL (ref 6–20)
CALCIUM SERPL-MCNC: 9.6 MG/DL (ref 8.8–10.4)
CHLORIDE SERPL-SCNC: 102 MMOL/L (ref 98–107)
CREAT SERPL-MCNC: 0.94 MG/DL (ref 0.67–1.17)
D DIMER PPP FEU-MCNC: 0.31 UG/ML FEU (ref 0–0.5)
EGFRCR SERPLBLD CKD-EPI 2021: >90 ML/MIN/1.73M2
EOSINOPHIL # BLD AUTO: 0.2 10E3/UL (ref 0–0.7)
EOSINOPHIL NFR BLD AUTO: 2 %
ERYTHROCYTE [DISTWIDTH] IN BLOOD BY AUTOMATED COUNT: 12.2 % (ref 10–15)
GLUCOSE SERPL-MCNC: 128 MG/DL (ref 70–99)
HCO3 SERPL-SCNC: 22 MMOL/L (ref 22–29)
HCT VFR BLD AUTO: 42.1 % (ref 40–53)
HGB BLD-MCNC: 14.9 G/DL (ref 13.3–17.7)
HOLD SPECIMEN: NORMAL
IMM GRANULOCYTES # BLD: 0 10E3/UL
IMM GRANULOCYTES NFR BLD: 0 %
LACTATE SERPL-SCNC: 1.2 MMOL/L (ref 0.7–2)
LIPASE SERPL-CCNC: 56 U/L (ref 13–60)
LYMPHOCYTES # BLD AUTO: 2.6 10E3/UL (ref 0.8–5.3)
LYMPHOCYTES NFR BLD AUTO: 27 %
MCH RBC QN AUTO: 31.3 PG (ref 26.5–33)
MCHC RBC AUTO-ENTMCNC: 35.4 G/DL (ref 31.5–36.5)
MCV RBC AUTO: 88 FL (ref 78–100)
MONOCYTES # BLD AUTO: 0.8 10E3/UL (ref 0–1.3)
MONOCYTES NFR BLD AUTO: 9 %
NEUTROPHILS # BLD AUTO: 5.7 10E3/UL (ref 1.6–8.3)
NEUTROPHILS NFR BLD AUTO: 61 %
NRBC # BLD AUTO: 0 10E3/UL
NRBC BLD AUTO-RTO: 0 /100
PLATELET # BLD AUTO: 311 10E3/UL (ref 150–450)
POTASSIUM SERPL-SCNC: 4.1 MMOL/L (ref 3.4–5.3)
PROT SERPL-MCNC: 7.3 G/DL (ref 6.4–8.3)
RBC # BLD AUTO: 4.76 10E6/UL (ref 4.4–5.9)
SODIUM SERPL-SCNC: 136 MMOL/L (ref 135–145)
TROPONIN T SERPL HS-MCNC: <6 NG/L
WBC # BLD AUTO: 9.4 10E3/UL (ref 4–11)

## 2024-08-24 PROCEDURE — 84484 ASSAY OF TROPONIN QUANT: CPT | Performed by: EMERGENCY MEDICINE

## 2024-08-24 PROCEDURE — 71046 X-RAY EXAM CHEST 2 VIEWS: CPT

## 2024-08-24 PROCEDURE — 85025 COMPLETE CBC W/AUTO DIFF WBC: CPT | Performed by: EMERGENCY MEDICINE

## 2024-08-24 PROCEDURE — 83605 ASSAY OF LACTIC ACID: CPT | Performed by: EMERGENCY MEDICINE

## 2024-08-24 PROCEDURE — 85379 FIBRIN DEGRADATION QUANT: CPT | Performed by: EMERGENCY MEDICINE

## 2024-08-24 PROCEDURE — 82040 ASSAY OF SERUM ALBUMIN: CPT | Performed by: EMERGENCY MEDICINE

## 2024-08-24 PROCEDURE — 99285 EMERGENCY DEPT VISIT HI MDM: CPT | Mod: 25

## 2024-08-24 PROCEDURE — 83690 ASSAY OF LIPASE: CPT | Performed by: EMERGENCY MEDICINE

## 2024-08-24 PROCEDURE — 76705 ECHO EXAM OF ABDOMEN: CPT

## 2024-08-24 PROCEDURE — 36415 COLL VENOUS BLD VENIPUNCTURE: CPT | Performed by: EMERGENCY MEDICINE

## 2024-08-24 PROCEDURE — 93005 ELECTROCARDIOGRAM TRACING: CPT

## 2024-08-24 RX ORDER — METOPROLOL SUCCINATE 50 MG/1
50 TABLET, EXTENDED RELEASE ORAL 2 TIMES DAILY
Qty: 60 TABLET | Refills: 0 | Status: SHIPPED | OUTPATIENT
Start: 2024-08-24 | End: 2024-09-23

## 2024-08-24 ASSESSMENT — ACTIVITIES OF DAILY LIVING (ADL)
ADLS_ACUITY_SCORE: 36

## 2024-08-24 ASSESSMENT — COLUMBIA-SUICIDE SEVERITY RATING SCALE - C-SSRS
2. HAVE YOU ACTUALLY HAD ANY THOUGHTS OF KILLING YOURSELF IN THE PAST MONTH?: NO
6. HAVE YOU EVER DONE ANYTHING, STARTED TO DO ANYTHING, OR PREPARED TO DO ANYTHING TO END YOUR LIFE?: NO
1. IN THE PAST MONTH, HAVE YOU WISHED YOU WERE DEAD OR WISHED YOU COULD GO TO SLEEP AND NOT WAKE UP?: NO

## 2024-08-24 NOTE — ED TRIAGE NOTES
Patient here with elevated blood pressure 193/120 today. He also c/o feeling lightheaded. He is also having epigastric pain since yesterday     Triage Assessment (Adult)       Row Name 08/24/24 1498          Triage Assessment    Airway WDL WDL        Respiratory WDL    Respiratory WDL WDL        Skin Circulation/Temperature WDL    Skin Circulation/Temperature WDL WDL        Cardiac WDL    Cardiac WDL WDL        Peripheral/Neurovascular WDL    Peripheral Neurovascular WDL WDL        Cognitive/Neuro/Behavioral WDL    Cognitive/Neuro/Behavioral WDL WDL

## 2024-08-25 LAB
ATRIAL RATE - MUSE: 108 BPM
DIASTOLIC BLOOD PRESSURE - MUSE: NORMAL MMHG
INTERPRETATION ECG - MUSE: NORMAL
P AXIS - MUSE: 41 DEGREES
PR INTERVAL - MUSE: 140 MS
QRS DURATION - MUSE: 66 MS
QT - MUSE: 322 MS
QTC - MUSE: 431 MS
R AXIS - MUSE: 12 DEGREES
SYSTOLIC BLOOD PRESSURE - MUSE: NORMAL MMHG
T AXIS - MUSE: -5 DEGREES
VENTRICULAR RATE- MUSE: 108 BPM

## 2024-08-25 NOTE — ED PROVIDER NOTES
Emergency Department Note      History of Present Illness     Chief Complaint   Dizziness    HPI   Pardeep Whaley is a 43 year old male with a history of hypertension and atrial fibrillation who presents to the ER for dizziness. Patient reports waking up this morning with nausea and fatigue and got more light headed and some paresthesias in both hands as the day progressed. He states that the paresthesias last for a few seconds and occurs randomly, not both at the same time. He recalls that he measured his blood pressure when sitting down and found it to be 183/120 and that he stopped taking metoprolol 2 years ago. Pardeep endorses indigestion, upper right quadrant abdominal pain for the last few days that gets worse when bending over, bloating after eating and drinking, pain in lower left flank after a deep breath, drinking a small amount of alcohol every week, drinking 2-3 cups of coffee every day, drinking a full energy drink yesterday, and a slight increase in stress recently. Patient denies chest pain sore throat, cough, shortness of breath, or taking any regular medication. He has no history of gallbladder issues.     Independent Historian   None    Review of External Notes   I reviewed the note from 3/9/24 where he was seen for a periapical abscess.     Past Medical History     Medical History and Problem List   Asthma  Hypertension  Atrial fibrillation    Medications   Aspirin 81 mg  Cetirizine   Metoprolol   Sildenafil     Surgical History   Cardioversion   H ablation focal Atrial Fibrillation    Physical Exam     Patient Vitals for the past 24 hrs:   BP Temp Temp src Pulse Resp SpO2   08/24/24 2130 (!) 157/116 -- -- 86 18 98 %   08/24/24 2100 (!) 161/109 -- -- 87 18 97 %   08/24/24 2050 (!) 159/113 -- -- 89 15 97 %   08/24/24 2000 -- -- -- 100 19 97 %   08/24/24 1930 (!) 184/121 -- -- 95 19 98 %   08/24/24 1852 (!) 200/123 99.1  F (37.3  C) Temporal 109 20 98 %     Physical Exam    Physical Exam    Constitutional:  Patient is oriented to person, place, and time. They appear well-developed and well-nourished.  HENT:   Mouth/Throat:   Oropharynx is clear and moist.   Eyes:    Conjunctivae normal and EOM are normal. Pupils are equal, round, and reactive to light.   Neck:    Normal range of motion.   Cardiovascular: Normal rate, regular rhythm and normal heart sounds.  Exam reveals no gallop and no friction rub.  No murmur heard.  Pulmonary/Chest:  Effort normal and breath sounds normal. Patient has no wheezes. Patient has no rales.   Abdominal:   Soft. Bowel sounds are normal. Patient exhibits no mass. There is no tenderness. There is no rebound and no guarding. Mild right upper quadrant pain.   Musculoskeletal:  Normal range of motion. Patient exhibits no edema.   Neurological:   Patient is alert and oriented to person, place, and time. Patient has normal strength. No cranial nerve deficit or sensory deficit. GCS 15  Skin:   Skin is warm and dry. No rash noted. No erythema.   Psychiatric:   Patient has a normal mood and affect. Patient's behavior is normal. Judgment and thought content normal.      Diagnostics     Lab Results   Labs Ordered and Resulted from Time of ED Arrival to Time of ED Departure   COMPREHENSIVE METABOLIC PANEL - Abnormal       Result Value    Sodium 136      Potassium 4.1      Carbon Dioxide (CO2) 22      Anion Gap 12      Urea Nitrogen 16.7      Creatinine 0.94      GFR Estimate >90      Calcium 9.6      Chloride 102      Glucose 128 (*)     Alkaline Phosphatase 62      AST 22      ALT 35      Protein Total 7.3      Albumin 4.7      Bilirubin Total 0.3     LACTIC ACID WHOLE BLOOD - Normal    Lactic Acid 1.2     TROPONIN T, HIGH SENSITIVITY - Normal    Troponin T, High Sensitivity <6     LIPASE - Normal    Lipase 56     D DIMER QUANTITATIVE - Normal    D-Dimer Quantitative 0.31     CBC WITH PLATELETS AND DIFFERENTIAL    WBC Count 9.4      RBC Count 4.76      Hemoglobin 14.9       Hematocrit 42.1      MCV 88      MCH 31.3      MCHC 35.4      RDW 12.2      Platelet Count 311      % Neutrophils 61      % Lymphocytes 27      % Monocytes 9      % Eosinophils 2      % Basophils 1      % Immature Granulocytes 0      NRBCs per 100 WBC 0      Absolute Neutrophils 5.7      Absolute Lymphocytes 2.6      Absolute Monocytes 0.8      Absolute Eosinophils 0.2      Absolute Basophils 0.1      Absolute Immature Granulocytes 0.0      Absolute NRBCs 0.0         Imaging   US Abdomen Limited (RUQ)   Final Result   IMPRESSION:   1.  No sonographic evidence of cholelithiasis or cholecystitis.   2.  Hepatic steatosis.            XR Chest 2 Views   Final Result   IMPRESSION:       Heart size is normal. Lungs are clear bilaterally. Mediastinum and visualized bony structures are unremarkable.           EKG   ECG taken at 1857, ECG read at 1915  Sinus tachycardia  Otherwise normal EKG   Rate 108 bpm. KS interval 140 ms. QRS duration 66 ms. QT/QTc 322/431 ms. P-R-T axes 41 12 -5.     Independent Interpretation   None    ED Course      Medications Administered   Medications - No data to display    Discussion of Management   None    ED Course   ED Course as of 08/24/24 2158   Sat Aug 24, 2024   1913 I obtained the history and examined the patient as noted above.    2151 I rechecked patient and explained findings and plan of care.         Additional Documentation  None    Medical Decision Making / Diagnosis     CMS Diagnoses: None    MIPS       None    MDM   Pardeep Whaley is a 43 year old male who is presenting to the emergency department with multiple complaints.  It sounds like he has not been on his blood pressure medications for quite some time he complained of paresthesias randomly on both sides of the body upper abdominal pain nausea and fatigue.  EKG was performed that shows a sinus tachycardia his heart is since normalized.  Blood work does not show any acute anemia or leukocytosis his D-dimer was within normal  limits I think given that he is not hypoxic or tachypneic he has been reasonably ruled out for PE.  Due to his right upper quadrant complaints a lipase and liver function were ordered and these fortunately are normal.  An ultrasound was performed he has fatty liver but no other acute findings.  Cardiac enzyme was performed due to his dizziness/lightheadedness this is fortunately undetectable.  Chest x-ray further does not show any acute pneumonia mass pleural effusion abnormal mediastinum.  He is persistently hypertensive and review of his chart does show that he is very hypertensive when coming to the emergency room given this and that he stopped taking his metoprolol I will get him restarted on a low dose and he has an appointment with his primary care doctor in a week.     Disposition   The patient was discharged.     Diagnosis     ICD-10-CM    1. Hypertension, unspecified type  I10            Discharge Medications   New Prescriptions    METOPROLOL SUCCINATE ER (TOPROL XL) 50 MG 24 HR TABLET    Take 1 tablet (50 mg) by mouth 2 times daily.         Scribe Disclosure:  I, Harsh Blake, am serving as a scribe at 7:07 PM on 8/24/2024 to document services personally performed by Laverne Oseguera MD based on my observations and the provider's statements to me.        Laverne Oseguera MD  08/27/24 [id number]

## 2024-08-27 ENCOUNTER — HOSPITAL ENCOUNTER (EMERGENCY)
Facility: CLINIC | Age: 44
Discharge: HOME | End: 2024-08-27
Attending: EMERGENCY MEDICINE | Admitting: EMERGENCY MEDICINE
Payer: COMMERCIAL

## 2024-08-27 VITALS
HEART RATE: 74 BPM | DIASTOLIC BLOOD PRESSURE: 116 MMHG | SYSTOLIC BLOOD PRESSURE: 166 MMHG | OXYGEN SATURATION: 97 % | TEMPERATURE: 97.4 F | RESPIRATION RATE: 18 BRPM

## 2024-08-27 DIAGNOSIS — I10 HYPERTENSION, UNSPECIFIED TYPE: ICD-10-CM

## 2024-08-27 LAB
ATRIAL RATE - MUSE: 72 BPM
DIASTOLIC BLOOD PRESSURE - MUSE: NORMAL MMHG
INTERPRETATION ECG - MUSE: NORMAL
P AXIS - MUSE: 28 DEGREES
PR INTERVAL - MUSE: 140 MS
QRS DURATION - MUSE: 68 MS
QT - MUSE: 356 MS
QTC - MUSE: 389 MS
R AXIS - MUSE: 20 DEGREES
SYSTOLIC BLOOD PRESSURE - MUSE: NORMAL MMHG
T AXIS - MUSE: 22 DEGREES
VENTRICULAR RATE- MUSE: 72 BPM

## 2024-08-27 PROCEDURE — 93005 ELECTROCARDIOGRAM TRACING: CPT

## 2024-08-27 PROCEDURE — 99283 EMERGENCY DEPT VISIT LOW MDM: CPT

## 2024-08-27 ASSESSMENT — ACTIVITIES OF DAILY LIVING (ADL): ADLS_ACUITY_SCORE: 36

## 2024-08-27 NOTE — ED PROVIDER NOTES
Emergency Department Note      History of Present Illness     Chief Complaint   High Blood Pressure    HPI   Pardeep Whaley is a 43 year old male with a history of hypertension and paroxysmal a-fib who presents to the ED for evaluation of high blood pressure. The patient reports that he started taking metoprolol on Sunday after being seen in the ER here on 8/24/2024.  When he checked his blood pressure today, it was 173/123. He states that he did take 25mg of his metoprolol this morning and his blood pressure dropped to 170. Patient called the nurse triage line and he was advised to present to the ED. He endorses some lightheadedness, intermittent numbness in his left arm, a twinge in his left side, he little shortness of breath, and palpitations.  He denies headache, vision changes, nausea, or vomiting. Pardeep drinks alcohol occasionally, but denies tobacco or other substance use. Patient mentions that he does drink 2-3 cups of black coffee a day. Of note, patient was on blood pressure medications years ago and he does have a primary care provider with a follow-up appointment coming up in a few days.    Independent Historian   None    Review of External Notes   I reviewed ED note from Dr. Oseguera on 8/24/24. Patient was seen for hypertension.  I reviewed nurse triage phone note from today.    Past Medical History     Medical History and Problem List   Asthma  Hypertension  Atrial fibrillation  Angioedema    Medications   Aspirin 81 mg  Toprol  Viagra     Surgical History   Cardioversion  Ablation of dysrhythmic focus    Physical Exam     Patient Vitals for the past 24 hrs:   BP Temp Temp src Pulse Resp SpO2   08/27/24 1211 (!) 166/116 97.4  F (36.3  C) Temporal 74 18 97 %     Physical Exam  Nursing note and vitals reviewed.  Constitutional:  Oriented to person, place, and time. Cooperative.   HENT:   Nose:    Nose normal.   Mouth/Throat:   Mucous membranes are normal.   Eyes:    Conjunctivae normal and EOM are  normal.      Pupils are equal, round, and reactive to light.   Neck:    Trachea normal.   Cardiovascular:  Normal rate, regular rhythm, normal heart sounds and normal pulses. No murmur heard.  Pulmonary/Chest:  Effort normal and breath sounds normal.   Abdominal:   Soft. Normal appearance and bowel sounds are normal.      There is no tenderness.      There is no rebound and no CVA tenderness.   Musculoskeletal:  Extremities atraumatic x 4.   Lymphadenopathy:  No cervical adenopathy.   Neurological:   Alert and oriented to person, place, and time. Normal strength.      No cranial nerve deficit or sensory deficit. GCS eye subscore is 4. GCS verbal subscore is 5. GCS motor subscore is 6.   Skin:    Skin is intact. No rash noted.   Psychiatric:   Normal mood and affect.     Diagnostics     Lab Results   None     Imaging   None     EKG   ECG taken at 1211, ECG read at 1304  Normal sinus rhythm  Cannot rule out anterior infarct, age undetermined   Rate 72 bpm. PA interval 140 ms. QRS duration 68 ms. QT/QTc 356/389 ms. P-R-T axes 28 20 22.    Independent Interpretation   None    ED Course      Medications Administered   Medications - No data to display    Procedures   None      Discussion of Management   None    ED Course   ED Course as of 08/27/24 1315   Tue Aug 27, 2024   1305 I obtained history and examined the patient as noted above.      Additional Documentation  None    Medical Decision Making / Diagnosis     CMS Diagnoses: None    MIPS       None    [facility]   Pardeep Whaley is a 43 year old male who came in for further evaluation of ongoing high blood pressure readings.  He really does not have worrisome symptoms today to suggest he might have endorgan damage.  He also had a workup obtained just a few days ago that was unremarkable.  His EKG here is unremarkable as well today.  I discussed repeating some of the blood work, but I do not feel it is necessary, and he is in agreement with that.  I reassured him that it can  take a couple of weeks for the blood pressure medications to actually start working, although he still may require a change in the medications regardless.  He feels comfortable going home at this point with that reassurance.  He should follow-up as previously scheduled with his primary physician and certainly return with any concerns or worsening symptoms.    Disposition   The patient was discharged.     Diagnosis     ICD-10-CM    1. Hypertension, unspecified type  I10          Discharge Medications   New Prescriptions    No medications on file     Scribe Disclosure:  SUHA HIDALGO, am serving as a scribe at 12:52 PM on 8/27/2024 to document services personally performed by Samuel Torres MD based on my observations and the provider's statements to me.      Samuel Torres MD  08/27/24 1731

## 2024-08-27 NOTE — ED TRIAGE NOTES
Pt presents to the ED with hypertension, reporting a home BP reading of 173/123.  Pt reports he was recently seen in the ED for HTN and started on metoprolol.  Pt took 25 mg PO metoprolol today.  Pt states he feels dizzy.     Triage Assessment (Adult)       Row Name 08/27/24 1212          Triage Assessment    Airway WDL WDL        Respiratory WDL    Respiratory WDL WDL        Skin Circulation/Temperature WDL    Skin Circulation/Temperature WDL WDL        Cardiac WDL    Cardiac WDL X  see note        Peripheral/Neurovascular WDL    Peripheral Neurovascular WDL WDL        Cognitive/Neuro/Behavioral WDL    Cognitive/Neuro/Behavioral WDL WDL